# Patient Record
Sex: FEMALE | Race: WHITE | NOT HISPANIC OR LATINO
[De-identification: names, ages, dates, MRNs, and addresses within clinical notes are randomized per-mention and may not be internally consistent; named-entity substitution may affect disease eponyms.]

---
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
## 2021-09-23 ENCOUNTER — APPOINTMENT (OUTPATIENT)
Dept: SURGERY | Facility: CLINIC | Age: 21
End: 2021-09-23
Payer: MEDICAID

## 2021-09-23 VITALS
BODY MASS INDEX: 35.79 KG/M2 | TEMPERATURE: 97.2 F | HEIGHT: 67 IN | OXYGEN SATURATION: 98 % | SYSTOLIC BLOOD PRESSURE: 148 MMHG | HEART RATE: 82 BPM | DIASTOLIC BLOOD PRESSURE: 100 MMHG | WEIGHT: 228 LBS

## 2021-09-23 PROCEDURE — 99202 OFFICE O/P NEW SF 15 MIN: CPT

## 2021-09-23 RX ORDER — DULAGLUTIDE 0.75 MG/.5ML
0.75 INJECTION, SOLUTION SUBCUTANEOUS
Refills: 0 | Status: ACTIVE | COMMUNITY

## 2021-09-23 RX ORDER — DOXYCYCLINE 100 MG/1
100 CAPSULE ORAL TWICE DAILY
Qty: 14 | Refills: 0 | Status: ACTIVE | COMMUNITY
Start: 2021-09-23 | End: 1900-01-01

## 2021-09-23 NOTE — PHYSICAL EXAM
[Normal Breath Sounds] : Normal breath sounds [Normal Heart Sounds] : normal heart sounds [Normal Rate and Rhythm] : normal rate and rhythm [Skin Ulcer] : no ulcer [Skin Induration] : induration [Alert] : alert [Oriented to Person] : oriented to person [Oriented to Place] : oriented to place [Oriented to Time] : oriented to time [Calm] : calm [de-identified] : obese white female in mild distress [de-identified] : normal bowel sounds, without distension or tenderness [de-identified] : left buttock with erythema, without collection [de-identified] : without calf pain or swelling

## 2021-09-23 NOTE — HISTORY OF PRESENT ILLNESS
[de-identified] : cellulitis of left buttock [de-identified] : 20 year old white female who presents c/o pain and swelling of her left buttock. Pt with states that for the last week she has noticed increased redness and swelling. She put meds on it last pm and it started to drain. She denies any fevers or chills.

## 2021-09-30 ENCOUNTER — NON-APPOINTMENT (OUTPATIENT)
Age: 21
End: 2021-09-30

## 2021-09-30 ENCOUNTER — APPOINTMENT (OUTPATIENT)
Dept: SURGERY | Facility: CLINIC | Age: 21
End: 2021-09-30
Payer: MEDICAID

## 2021-09-30 VITALS — TEMPERATURE: 98.6 F

## 2021-09-30 DIAGNOSIS — L03.317 CELLULITIS OF BUTTOCK: ICD-10-CM

## 2021-09-30 PROCEDURE — 99212 OFFICE O/P EST SF 10 MIN: CPT

## 2021-09-30 NOTE — PHYSICAL EXAM
[Normal Breath Sounds] : Normal breath sounds [Normal Heart Sounds] : normal heart sounds [Normal Rate and Rhythm] : normal rate and rhythm [Calm] : calm [de-identified] : obese female in no distress [de-identified] : benign [de-identified] : buttock area clean and closed, with resolved cellulitis

## 2021-09-30 NOTE — HISTORY OF PRESENT ILLNESS
[de-identified] : cellulitis of left buttock [de-identified] : pt improved, without drainage or further swelling, Pt c/o itching

## 2024-01-18 NOTE — ASSESSMENT
[FreeTextEntry1] : Impression\par \par Bowel movements are now normal\par \par Mild liver transaminase elevation\par \par Resume mild steatohepatitis\par \par Weight gain\par \par Suggest\par \par The patient will try to make a better effort at weight loss and low-fat diet and moderate exercise\par \par Obviously at this time with the beginning of college it may be difficult, and she'll give this more effort and she settles into her routine\par \par Repeat lab work\par \par I will call her with her parents with lab results\par \par Followup with me in 6 months
Never

## 2024-03-15 ENCOUNTER — TRANSCRIPTION ENCOUNTER (OUTPATIENT)
Age: 24
End: 2024-03-15

## 2024-03-16 ENCOUNTER — INPATIENT (INPATIENT)
Facility: HOSPITAL | Age: 24
LOS: 0 days | Discharge: HOME | DRG: 819 | End: 2024-03-16
Attending: OBSTETRICS & GYNECOLOGY | Admitting: OBSTETRICS & GYNECOLOGY
Payer: MEDICAID

## 2024-03-16 ENCOUNTER — TRANSCRIPTION ENCOUNTER (OUTPATIENT)
Age: 24
End: 2024-03-16

## 2024-03-16 VITALS
RESPIRATION RATE: 17 BRPM | SYSTOLIC BLOOD PRESSURE: 137 MMHG | DIASTOLIC BLOOD PRESSURE: 87 MMHG | HEART RATE: 87 BPM | OXYGEN SATURATION: 97 %

## 2024-03-16 VITALS
DIASTOLIC BLOOD PRESSURE: 100 MMHG | RESPIRATION RATE: 20 BRPM | SYSTOLIC BLOOD PRESSURE: 160 MMHG | OXYGEN SATURATION: 97 % | WEIGHT: 179.9 LBS | HEART RATE: 95 BPM | TEMPERATURE: 98 F | HEIGHT: 68 IN

## 2024-03-16 DIAGNOSIS — O00.90 UNSPECIFIED ECTOPIC PREGNANCY WITHOUT INTRAUTERINE PREGNANCY: ICD-10-CM

## 2024-03-16 LAB
ALBUMIN SERPL ELPH-MCNC: 4.3 G/DL — SIGNIFICANT CHANGE UP (ref 3.3–5.2)
ALP SERPL-CCNC: 82 U/L — SIGNIFICANT CHANGE UP (ref 40–120)
ALT FLD-CCNC: 46 U/L — HIGH
ANION GAP SERPL CALC-SCNC: 13 MMOL/L — SIGNIFICANT CHANGE UP (ref 5–17)
ANISOCYTOSIS BLD QL: SLIGHT — SIGNIFICANT CHANGE UP
APTT BLD: 33.8 SEC — SIGNIFICANT CHANGE UP (ref 24.5–35.6)
AST SERPL-CCNC: 33 U/L — HIGH
BASOPHILS # BLD AUTO: 0 K/UL — SIGNIFICANT CHANGE UP (ref 0–0.2)
BASOPHILS NFR BLD AUTO: 0 % — SIGNIFICANT CHANGE UP (ref 0–2)
BILIRUB SERPL-MCNC: 0.5 MG/DL — SIGNIFICANT CHANGE UP (ref 0.4–2)
BLD GP AB SCN SERPL QL: SIGNIFICANT CHANGE UP
BUN SERPL-MCNC: 8.2 MG/DL — SIGNIFICANT CHANGE UP (ref 8–20)
CALCIUM SERPL-MCNC: 9.3 MG/DL — SIGNIFICANT CHANGE UP (ref 8.4–10.5)
CHLORIDE SERPL-SCNC: 98 MMOL/L — SIGNIFICANT CHANGE UP (ref 96–108)
CO2 SERPL-SCNC: 25 MMOL/L — SIGNIFICANT CHANGE UP (ref 22–29)
CREAT SERPL-MCNC: 0.61 MG/DL — SIGNIFICANT CHANGE UP (ref 0.5–1.3)
EGFR: 130 ML/MIN/1.73M2 — SIGNIFICANT CHANGE UP
EOSINOPHIL # BLD AUTO: 0 K/UL — SIGNIFICANT CHANGE UP (ref 0–0.5)
EOSINOPHIL NFR BLD AUTO: 0 % — SIGNIFICANT CHANGE UP (ref 0–6)
GLUCOSE SERPL-MCNC: 105 MG/DL — HIGH (ref 70–99)
HCT VFR BLD CALC: 39.8 % — SIGNIFICANT CHANGE UP (ref 34.5–45)
HGB BLD-MCNC: 13.3 G/DL — SIGNIFICANT CHANGE UP (ref 11.5–15.5)
INR BLD: 1.16 RATIO — SIGNIFICANT CHANGE UP (ref 0.85–1.18)
LYMPHOCYTES # BLD AUTO: 20.5 % — SIGNIFICANT CHANGE UP (ref 13–44)
LYMPHOCYTES # BLD AUTO: 3.41 K/UL — HIGH (ref 1–3.3)
MANUAL SMEAR VERIFICATION: SIGNIFICANT CHANGE UP
MCHC RBC-ENTMCNC: 24.5 PG — LOW (ref 27–34)
MCHC RBC-ENTMCNC: 33.4 GM/DL — SIGNIFICANT CHANGE UP (ref 32–36)
MCV RBC AUTO: 73.4 FL — LOW (ref 80–100)
MICROCYTES BLD QL: SLIGHT — SIGNIFICANT CHANGE UP
MONOCYTES # BLD AUTO: 1.33 K/UL — HIGH (ref 0–0.9)
MONOCYTES NFR BLD AUTO: 8 % — SIGNIFICANT CHANGE UP (ref 2–14)
NEUTROPHILS # BLD AUTO: 10.84 K/UL — HIGH (ref 1.8–7.4)
NEUTROPHILS NFR BLD AUTO: 65.2 % — SIGNIFICANT CHANGE UP (ref 43–77)
PLAT MORPH BLD: NORMAL — SIGNIFICANT CHANGE UP
PLATELET # BLD AUTO: 294 K/UL — SIGNIFICANT CHANGE UP (ref 150–400)
POLYCHROMASIA BLD QL SMEAR: SIGNIFICANT CHANGE UP
POTASSIUM SERPL-MCNC: 4.1 MMOL/L — SIGNIFICANT CHANGE UP (ref 3.5–5.3)
POTASSIUM SERPL-SCNC: 4.1 MMOL/L — SIGNIFICANT CHANGE UP (ref 3.5–5.3)
PROT SERPL-MCNC: 8 G/DL — SIGNIFICANT CHANGE UP (ref 6.6–8.7)
PROTHROM AB SERPL-ACNC: 12.8 SEC — SIGNIFICANT CHANGE UP (ref 9.5–13)
RBC # BLD: 5.42 M/UL — HIGH (ref 3.8–5.2)
RBC # FLD: 14.2 % — SIGNIFICANT CHANGE UP (ref 10.3–14.5)
RBC BLD AUTO: ABNORMAL
SODIUM SERPL-SCNC: 136 MMOL/L — SIGNIFICANT CHANGE UP (ref 135–145)
VARIANT LYMPHS # BLD: 6.3 % — HIGH (ref 0–6)
WBC # BLD: 16.62 K/UL — HIGH (ref 3.8–10.5)
WBC # FLD AUTO: 16.62 K/UL — HIGH (ref 3.8–10.5)

## 2024-03-16 PROCEDURE — 76817 TRANSVAGINAL US OBSTETRIC: CPT

## 2024-03-16 PROCEDURE — 96375 TX/PRO/DX INJ NEW DRUG ADDON: CPT

## 2024-03-16 PROCEDURE — 36415 COLL VENOUS BLD VENIPUNCTURE: CPT

## 2024-03-16 PROCEDURE — 76801 OB US < 14 WKS SINGLE FETUS: CPT

## 2024-03-16 PROCEDURE — 76801 OB US < 14 WKS SINGLE FETUS: CPT | Mod: 26

## 2024-03-16 PROCEDURE — 80053 COMPREHEN METABOLIC PANEL: CPT

## 2024-03-16 PROCEDURE — 86900 BLOOD TYPING SEROLOGIC ABO: CPT

## 2024-03-16 PROCEDURE — 96374 THER/PROPH/DIAG INJ IV PUSH: CPT

## 2024-03-16 PROCEDURE — 88305 TISSUE EXAM BY PATHOLOGIST: CPT | Mod: 26

## 2024-03-16 PROCEDURE — 86850 RBC ANTIBODY SCREEN: CPT

## 2024-03-16 PROCEDURE — 59150 TREAT ECTOPIC PREGNANCY: CPT | Mod: GC

## 2024-03-16 PROCEDURE — 88305 TISSUE EXAM BY PATHOLOGIST: CPT

## 2024-03-16 PROCEDURE — 93010 ELECTROCARDIOGRAM REPORT: CPT

## 2024-03-16 PROCEDURE — 76817 TRANSVAGINAL US OBSTETRIC: CPT | Mod: 26

## 2024-03-16 PROCEDURE — 85610 PROTHROMBIN TIME: CPT

## 2024-03-16 PROCEDURE — 86901 BLOOD TYPING SEROLOGIC RH(D): CPT

## 2024-03-16 PROCEDURE — 85730 THROMBOPLASTIN TIME PARTIAL: CPT

## 2024-03-16 PROCEDURE — 99285 EMERGENCY DEPT VISIT HI MDM: CPT

## 2024-03-16 PROCEDURE — 84702 CHORIONIC GONADOTROPIN TEST: CPT

## 2024-03-16 PROCEDURE — 85025 COMPLETE CBC W/AUTO DIFF WBC: CPT

## 2024-03-16 PROCEDURE — 93005 ELECTROCARDIOGRAM TRACING: CPT

## 2024-03-16 PROCEDURE — C9399: CPT

## 2024-03-16 DEVICE — CLIP APPLIER ETHICON LIGAMAX 5MM: Type: IMPLANTABLE DEVICE | Site: RIGHT | Status: FUNCTIONAL

## 2024-03-16 RX ORDER — KETOROLAC TROMETHAMINE 30 MG/ML
15 SYRINGE (ML) INJECTION ONCE
Refills: 0 | Status: DISCONTINUED | OUTPATIENT
Start: 2024-03-16 | End: 2024-03-16

## 2024-03-16 RX ORDER — ONDANSETRON 8 MG/1
4 TABLET, FILM COATED ORAL ONCE
Refills: 0 | Status: DISCONTINUED | OUTPATIENT
Start: 2024-03-16 | End: 2024-03-16

## 2024-03-16 RX ORDER — ONDANSETRON 8 MG/1
4 TABLET, FILM COATED ORAL ONCE
Refills: 0 | Status: COMPLETED | OUTPATIENT
Start: 2024-03-16 | End: 2024-03-16

## 2024-03-16 RX ORDER — ACETAMINOPHEN 500 MG
1000 TABLET ORAL ONCE
Refills: 0 | Status: COMPLETED | OUTPATIENT
Start: 2024-03-16 | End: 2024-03-16

## 2024-03-16 RX ORDER — HYDROMORPHONE HYDROCHLORIDE 2 MG/ML
0.5 INJECTION INTRAMUSCULAR; INTRAVENOUS; SUBCUTANEOUS
Refills: 0 | Status: DISCONTINUED | OUTPATIENT
Start: 2024-03-16 | End: 2024-03-16

## 2024-03-16 RX ORDER — MORPHINE SULFATE 50 MG/1
4 CAPSULE, EXTENDED RELEASE ORAL ONCE
Refills: 0 | Status: DISCONTINUED | OUTPATIENT
Start: 2024-03-16 | End: 2024-03-16

## 2024-03-16 RX ORDER — SODIUM CHLORIDE 9 MG/ML
1000 INJECTION, SOLUTION INTRAVENOUS
Refills: 0 | Status: DISCONTINUED | OUTPATIENT
Start: 2024-03-16 | End: 2024-03-16

## 2024-03-16 RX ORDER — OXYCODONE HYDROCHLORIDE 5 MG/1
1 TABLET ORAL
Qty: 10 | Refills: 0
Start: 2024-03-16

## 2024-03-16 RX ORDER — FENTANYL CITRATE 50 UG/ML
25 INJECTION INTRAVENOUS
Refills: 0 | Status: DISCONTINUED | OUTPATIENT
Start: 2024-03-16 | End: 2024-03-16

## 2024-03-16 RX ORDER — KETOROLAC TROMETHAMINE 30 MG/ML
30 SYRINGE (ML) INJECTION ONCE
Refills: 0 | Status: DISCONTINUED | OUTPATIENT
Start: 2024-03-16 | End: 2024-03-16

## 2024-03-16 RX ADMIN — Medication 1000 MILLIGRAM(S): at 02:20

## 2024-03-16 RX ADMIN — Medication 1000 MILLIGRAM(S): at 01:50

## 2024-03-16 RX ADMIN — SODIUM CHLORIDE 125 MILLILITER(S): 9 INJECTION, SOLUTION INTRAVENOUS at 05:40

## 2024-03-16 RX ADMIN — ONDANSETRON 4 MILLIGRAM(S): 8 TABLET, FILM COATED ORAL at 01:50

## 2024-03-16 RX ADMIN — Medication 400 MILLIGRAM(S): at 01:50

## 2024-03-16 RX ADMIN — Medication 400 MILLIGRAM(S): at 10:31

## 2024-03-16 RX ADMIN — Medication 1000 MILLIGRAM(S): at 10:45

## 2024-03-16 NOTE — ED PROVIDER NOTE - CLINICAL SUMMARY MEDICAL DECISION MAKING FREE TEXT BOX
23y F presents as transfer from [facility] for possible ectopic pregnancy. Pt well appearing and hemodynamically stable at this time. Spoke with GYN team; will obtain repeat labs and US. 23y F presents as transfer from [facility] for possible ectopic pregnancy. Pt well appearing and hemodynamically stable at this time. Spoke with GYN team; will obtain repeat labs and US.    US confirming ectopic. Pt to be taken to OR by GYN team.

## 2024-03-16 NOTE — CONSULT NOTE ADULT - SUBJECTIVE AND OBJECTIVE BOX
HPI: 23y  (LMP 23) who presented to [facility] earlier today with spotting and abdominal pain since 1500. Per report she had a + HCG of 475 and an US with moderate amount of fluid in pelvis but no adnexal masses or IUP visualized. Pt transferred by helicopter.  Pt reports continued R sided flank pain radiating to groin. No n/v. States that she has spotting  LMP 23 but had spotting - and 3/5-. Desired pregnancy but patient understands gravity of risks of ectopic pregnancy. Pt and partner have been passively trying for 2 years without successful pregnancy  Has no established GYN    PMHX; preDM, vasovagal syncope episodes. hospitalization for cholecystitis (had endoscopy, no sx)  PSHX; wisdom teeth removal, endoscopy  POBHX; G1, found out about pregnancy today  PGYNHX: -cysts, fibroids, STIs. Had 1 pap that was normal  SOCIAL: denies x3  Allergies No Known Allergies  MEDS: no home meds      Vital Signs Last 24 Hrs  T(C): 36.9 (16 Mar 2024 00:16), Max: 36.9 (16 Mar 2024 00:16)  T(F): 98.4 (16 Mar 2024 00:16), Max: 98.4 (16 Mar 2024 00:16)  HR: 95 (16 Mar 2024 00:16) (95 - 95)  BP: 160/100 (16 Mar 2024 00:16) (160/100 - 160/100)  RR: 20 (16 Mar 2024 00:16) (20 - 20)  SpO2: 97% (16 Mar 2024 00:16) (97% - 97%)      PHYSICAL EXAM:  CHEST/LUNG: Clear to percussion bilaterally; No rales, rhonchi, wheezing, or rubs  HEART: Regular rate and rhythm; No murmurs, rubs, or gallops  ABDOMEN: Soft, non distended. No CVA tenderness. Tenderness to palpation diffusely, worse in lower abdomen, worse in RLQ  EXTREMITIES:  2+ Peripheral Pulses, No clubbing, cyanosis, or edema  PELVIC: pending    LABS:        pending        RADIOLOGY STUDIES:  pending TVUS

## 2024-03-16 NOTE — H&P ADULT - ASSESSMENT
A/P 22 yo  transferred with suspcion of ectopic pregnancy.  -VSS, hypertensive  -afebrile  -Hgb wnl  -Mild leukocytosis  -  -US indicative of ruptured ectopic pregnancy  -Rh+, no Rhogam  -Tender to palpation in RLQ on abd exam  -Discussed that surgical management is the recommended treatment  -Questions answered  -Consents  -Add on to OR for urgent laparoscopic salpingectomy    Seen with Dr Narayan   A/P 22 yo  transferred with suspicion of ectopic pregnancy.  -VSS, hypertensive  -afebrile  -Hgb wnl  -Mild leukocytosis  -  -US indicative of ruptured ectopic pregnancy  -Rh+, no Rhogam  -Tender to palpation in RLQ on abd exam  -Discussed that surgical management is the recommended treatment  -Questions answered  -Consents  -Add on to OR for urgent laparoscopic salpingectomy    Seen with Dr Narayan

## 2024-03-16 NOTE — ED ADULT NURSE NOTE - NSFALLUNIVINTERV_ED_ALL_ED
Bed/Stretcher in lowest position, wheels locked, appropriate side rails in place/Call bell, personal items and telephone in reach/Instruct patient to call for assistance before getting out of bed/chair/stretcher/Non-slip footwear applied when patient is off stretcher/[place] to call system/Physically safe environment - no spills, clutter or unnecessary equipment/Purposeful proactive rounding/Room/bathroom lighting operational, light cord in reach

## 2024-03-16 NOTE — ED ADULT NURSE NOTE - OBJECTIVE STATEMENT
pt. is aaox4. here for ectopic pregnancy. iv access obtained, bloods sent, pending gyn consult. safety maintained.

## 2024-03-16 NOTE — ASU DISCHARGE PLAN (ADULT/PEDIATRIC) - CARE PROVIDER_API CALL
Rimpel, Katherinne  Obstetrics and Gynecology  [address] [place] [id number]  Phone: [phone]  Fax: [phone]  Follow Up Time: 2 weeks

## 2024-03-16 NOTE — ASU DISCHARGE PLAN (ADULT/PEDIATRIC) - NS MD DC FALL RISK RISK
For information on Fall & Injury Prevention, visit: [email or web address] OR  [email or web address] OR  https://www.cdc.gov/steadi/patient.html

## 2024-03-16 NOTE — H&P ADULT - HISTORY OF PRESENT ILLNESS
HPI: 23y  (LMP 23) who presented to [place] earlier today with spotting and abdominal pain since 1500. Per report she had a + HCG of 475 and an US with moderate amount of fluid in pelvis but no adnexal masses or IUP visualized. Pt transferred by helicopter.  Pt reports continued R sided flank pain radiating to groin. No n/v. States that she has spotting  LMP 24 but had spotting - and 3/5-. Desired pregnancy but patient understands gravity of risks of ectopic pregnancy. Pt and partner have been passively trying for 2 years without successful pregnancy  Has no established GYN    PMHX; preDM, vasovagal syncope episodes. hospitalization for cholecystitis (had endoscopy, no sx)  PSHX; wisdom teeth removal, endoscopy  POBHX; G1, found out about pregnancy today  PGYNHX: -cysts, fibroids, STIs. Had 1 pap that was normal  SOCIAL: denies x3  Allergies No Known Allergies  MEDS: no home meds

## 2024-03-16 NOTE — ED ADULT NURSE NOTE - CHIEF COMPLAINT QUOTE
pt GIUSEPPE via [facility] from [place] for ectopic pregnancy and fluid found in abdominal awaiting GYN consults for further plan complaining of mild pain at this time

## 2024-03-16 NOTE — ED PROVIDER NOTE - NS ED ROS FT
Constitutional: no fever  CV: no chest pain  Resp: no cough, no shortness of breath  GI: +abdominal pain, no vomiting, no diarrhea  : no dysuria  MSK: no joint pain  Neuro: no headache

## 2024-03-16 NOTE — ED PROVIDER NOTE - PHYSICAL EXAMINATION
Constitutional: Awake, alert, in no acute distress  Eyes: no scleral icterus  HENT: normocephalic, atraumatic, moist oral mucosa  Neck: supple  CV: RRR, no murmur  Pulm: non-labored respirations, CTAB  Abdomen: soft, +RLQ tenderness, no rebound or guarding, non-distended  Extremities: no edema, no deformity  Skin: no rash, no jaundice  Neuro: AAOx3, moving all extremities equally

## 2024-03-16 NOTE — H&P ADULT - ATTENDING COMMENTS
24 yo  transferred with suspicion of right ruptured ectopic pregnancy although clinically stable, not a candidate for methotrexate. Extensive discussion re R/B/A of laparoscopy and possible right salpingectomy, possible oophorectomy, laparotomy. Implications for future pregnancies discussed, patient aware of the risk of recurrent ectopics. Consent signed after questions answered. Await OR availability 22 yo  transferred with suspicion of right ruptured ectopic pregnancy although clinically stable, not a candidate for methotrexate. Extensive discussion re R/B/A of laparoscopy and possible right salpingectomy, possible oophorectomy, laparotomy. Implications for future pregnancies discussed, patient aware of the risk of recurrent ectopics. Consent signed after questions answered. Await OR availability    Covering attending addendum:   Took over for Dr Page. Pt consented for OR for laparoscopy and possible salpingectomy for concern for ruptured ectopic.     Angeles Dickinson MD

## 2024-03-16 NOTE — ED PROVIDER NOTE - OBJECTIVE STATEMENT
23y F w/ hx HTN (not on meds), presents as transfer from [facility] for possible ectopic pregnancy. Pt reports onset earlier today of right lower abdominal/flank pain. Was seen at urgent care and found to have positive urine pregnancy test. Pt is . Says she has had 2 episodes of vaginal spotting over the past few weeks but denies active bleeding. Was referred to the ED at [facility]; had US done concerning for possible ectopic pregnancy and was transferred for evaluation by OBGYN.

## 2024-03-16 NOTE — ED ADULT TRIAGE NOTE - CHIEF COMPLAINT QUOTE
pt GIUSEPPE via [facility] from [place] for ectopic pregnancy and fluid found in abdominal awaiting GYN consults for further plan complaining of mild pain at this time

## 2024-03-16 NOTE — H&P ADULT - NSHPPHYSICALEXAM_GEN_ALL_CORE
Vital Signs Last 24 Hrs  T(C): 36.9 (16 Mar 2024 00:16), Max: 36.9 (16 Mar 2024 00:16)  T(F): 98.4 (16 Mar 2024 00:16), Max: 98.4 (16 Mar 2024 00:16)  HR: 95 (16 Mar 2024 00:16) (95 - 95)  BP: 160/100 (16 Mar 2024 00:16) (160/100 - 160/100)  RR: 20 (16 Mar 2024 00:16) (20 - 20)  SpO2: 97% (16 Mar 2024 00:16) (97% - 97%)      PHYSICAL EXAM:  CHEST/LUNG: Clear to percussion bilaterally; No rales, rhonchi, wheezing, or rubs  HEART: Regular rate and rhythm; No murmurs, rubs, or gallops  ABDOMEN: Soft, non distended. No CVA tenderness. Tenderness to palpation diffusely, worse in lower abdomen, worse in RLQ  EXTREMITIES:  2+ Peripheral Pulses, No clubbing, cyanosis, or edema  PELVIC: pending

## 2024-03-16 NOTE — BRIEF OPERATIVE NOTE - OPERATION/FINDINGS
100 cc of hemoperitoneum, right fallopian tube with ectopic pregnancy, grossly normal uterus, left fallopian tube, bilateral ovaries

## 2024-03-16 NOTE — H&P ADULT - NSHPLABSRESULTS_GEN_ALL_CORE
LABS:                        13.3   16.62 )-----------( 294      ( 16 Mar 2024 01:06 )             39.8     HCG Quantitative, Serum: 413.4        RADIOLOGY STUDIES:  < from: US Transvaginal, OB (03.16.24 @ 03:07) >        INTERPRETATION:  CLINICAL INFORMATION: Right lower quadrant pain.   Evaluate for ectopic pregnancy.    COMPARISON: None available.    Endovaginal and transabdominal pelvic sonogram. Color and Spectral   Doppler was performed.    FINDINGS:  Uterus: 6 x 4 x 3 x 4 cm. Intrauterine gestation is not seen.    Right ovary: 3.4  x 1.8 x 1.9. Within normal limits. Normal arterial and   venous waveforms. There is a 3 x 2 x 2.6 cm complex heterogeneous   structure seen in the right adnexa, likely representing an ectopic   pregnancy.  Left ovary: 4.6 x 3.3 x 3.5. Small ovarian cysts/follicles, largest   measuring up to 2.2 cm. Normal arterial and venous waveforms.    Fluid: Moderate amount of complex free fluid is seen within the pelvis,   likely representing blood products.    IMPRESSION:  Right adnexal ectopic pregnancy with moderate complex free fluid   suggesting blood products. OB/GYN consultation recommended.    Critical findings were discussed with Dr. Joe Taylor at 3:20 AM on   3/16/2024.    --- End of Report ---

## 2024-03-16 NOTE — CONSULT NOTE ADULT - ASSESSMENT
A/P: 23y  (LMP 24) who presented to [place] earlier today with spotting and abdominal pain since 1500, brought by helicopter for possible ectopic pregnancy  -VSS, hypertensive. Pt in NAD  -Abd exam remarkable for diffuse tenderness, most in RLQ  -Pending labs and TVUS  -Discussed ectopic pregnancy diagnosis with her and her partner. Pt is aware that diagnosis currently is pregnancy of unknown location. General options for ectopic pregnancy discussed at length including: expectant, Methotrexate, and surgical management. Risks and benefits discussed of each. Patient understands that final recommendation depends on imaging and labs.   - Per report she had a + HCG of 475 and an US with moderate amount of fluid in pelvis but no adnexal masses or IUP visualized. Will repeat here  -T&S, CBC    Pt discussed with Dr Narayan

## 2024-03-20 LAB — SURGICAL PATHOLOGY STUDY: SIGNIFICANT CHANGE UP

## 2024-06-18 NOTE — REVIEW OF SYSTEMS
06/18/24 2:24 PM     Chart reviewed for Pap Smear (HPV) aka Cervical Cancer Screening was/were submitted to the patient's insurance.     Inés Haq   PG VALUE BASED VIR   [Dizziness] : dizziness [Feeling Poorly] : not feeling poorly (malaise) [Fever] : no fever [Pallor] : not pale [Wgt Loss (___ Lbs)] : no recent weight loss [Redness] : no redness [Eye Discharge] : no eye discharge [Change in Vision] : no change in vision [Nasal Stuffiness] : no nasal congestion [Earache] : no earache [Sore Throat] : no sore throat [Loss Of Hearing] : no hearing loss [Edema] : no edema [Cyanosis] : no cyanosis [Diaphoresis] : not diaphoretic [Exercise Intolerance] : no persistence of exercise intolerance [Chest Pain] : no chest pain or discomfort [Orthopnea] : no orthopnea [Palpitations] : no palpitations [Fast HR] : no tachycardia [Tachypnea] : not tachypneic [Wheezing] : no wheezing [Cough] : no cough [Shortness Of Breath] : not expressed as feeling short of breath [Vomiting] : no vomiting [Diarrhea] : no diarrhea [Abdominal Pain] : no abdominal pain [Fainting (Syncope)] : no fainting [Decrease In Appetite] : appetite not decreased [Seizure] : no seizures [Headache] : no headache [Limping] : no limping [Joint Pains] : no arthralgias [Rash] : no rash [Joint Swelling] : no joint swelling [Wound problems] : no wound problems [Easy Bruising] : no tendency for easy bruising [Swollen Glands] : no lymphadenopathy [Nosebleeds] : no epistaxis [Easy Bleeding] : no ~M tendency for easy bleeding [Sleep Disturbances] : ~T no sleep disturbances [Depression] : no depression [Hyperactive] : no hyperactive behavior [Anxiety] : no anxiety [Failure To Thrive] : no failure to thrive [Short Stature] : short stature was not noted [Heat/Cold Intolerance] : no temperature intolerance [Jitteriness] : no jitteriness [Dec Urine Output] : no oliguria

## 2024-09-09 NOTE — REASON FOR VISIT
no [FreeTextEntry1] : Mild elevated liver function tests, presumed from steatohepatitis, bowel movements have returned to normal, mild obesity

## (undated) DEVICE — SOL IRR POUR H2O 1000ML

## (undated) DEVICE — TUBING INSUFFLATION LAP FILTER 10FT

## (undated) DEVICE — TROCAR COVIDIEN VERSAPORT BLADELESS OPTICAL 11MM STANDARD

## (undated) DEVICE — SUT VICRYL 4-0 18" PS-2 UNDYED

## (undated) DEVICE — PREP DYNA-HEX CHG 4% 4OZ BOTTLE (BACTOSHIELD)

## (undated) DEVICE — UTERINE MANIPULATOR THOMAS MEDICAL 4.5MM

## (undated) DEVICE — ENDOPATH 5MM CVD SCISSOR W MONOPOLAR CAUTERY DISP

## (undated) DEVICE — VENODYNE/SCD SLEEVE CALF MEDIUM

## (undated) DEVICE — SUT VICRYL 0 27" CT-2

## (undated) DEVICE — ELCTR CORD FOOTSWITCH 1PLR LAPSCP 10FT

## (undated) DEVICE — POSITIONER PINK PAD PIGAZZI SYSTEM

## (undated) DEVICE — TROCAR COVIDIEN VERSAPORT BLADELESS OPTICAL 5MM STANDARD

## (undated) DEVICE — PREP TRAY DRY SKIN PREP SCRUB

## (undated) DEVICE — ENDOCATCH 10MM SPECIMEN POUCH

## (undated) DEVICE — ENDOCATCH II 15MM

## (undated) DEVICE — TRAP SPECIMEN SPUTUM 40CC

## (undated) DEVICE — PACK GYN LAPAROSCOPY

## (undated) DEVICE — SYR CONTROL LUER LOK 10CC

## (undated) DEVICE — FOLEY TRAY 16FR 5CC LF UMETER CLOSED

## (undated) DEVICE — NDL HYPO REGULAR BEVEL 25G X 1.5" (BLUE)

## (undated) DEVICE — LIGASURE MARYLAND 37CM

## (undated) DEVICE — D HELP - CLEARVIEW CLEARIFY SYSTEM

## (undated) DEVICE — SYR LUER LOK 30CC

## (undated) DEVICE — LIGASURE BLUNT TIP 37CM

## (undated) DEVICE — TROCAR COVIDIEN VERSAONE FIXATION CANNULA 5MM

## (undated) DEVICE — SOL IRR POUR NS 0.9% 1000ML

## (undated) DEVICE — INSUFFLATION NDL ETHICON ENDOPATH PNEUMOPARITONEUM 120MM

## (undated) DEVICE — PREP CHLORAPREP HI-LITE ORANGE 26ML

## (undated) DEVICE — WARMING BLANKET UPPER ADULT

## (undated) DEVICE — SYR LUER LOK 10CC

## (undated) DEVICE — TUBING STRYKEFLOW II SUCTION / IRRIGATOR

## (undated) DEVICE — GLV 6 PROTEXIS (WHITE)

## (undated) DEVICE — DRAPE TOWEL BLUE 17" X 24"